# Patient Record
Sex: MALE | Race: WHITE | NOT HISPANIC OR LATINO | Employment: UNEMPLOYED | URBAN - NONMETROPOLITAN AREA
[De-identification: names, ages, dates, MRNs, and addresses within clinical notes are randomized per-mention and may not be internally consistent; named-entity substitution may affect disease eponyms.]

---

## 2018-01-29 ENCOUNTER — TRANSCRIBE ORDERS (OUTPATIENT)
Dept: ADMINISTRATIVE | Facility: HOSPITAL | Age: 43
End: 2018-01-29

## 2018-01-29 ENCOUNTER — HOSPITAL ENCOUNTER (OUTPATIENT)
Dept: GENERAL RADIOLOGY | Facility: HOSPITAL | Age: 43
Discharge: HOME | End: 2018-01-29
Admitting: FAMILY MEDICINE

## 2018-01-29 DIAGNOSIS — M79.672 PAIN IN BOTH FEET: Primary | ICD-10-CM

## 2018-01-29 DIAGNOSIS — M79.671 PAIN IN BOTH FEET: Primary | ICD-10-CM

## 2018-01-29 PROCEDURE — 73630 X-RAY EXAM OF FOOT: CPT

## 2018-02-28 ENCOUNTER — PROCEDURE VISIT (OUTPATIENT)
Dept: ORTHOPEDIC SURGERY | Facility: CLINIC | Age: 43
End: 2018-02-28

## 2018-02-28 DIAGNOSIS — M79.672 LEFT FOOT PAIN: ICD-10-CM

## 2018-02-28 DIAGNOSIS — M54.17 LUMBOSACRAL NEURITIS: ICD-10-CM

## 2018-02-28 DIAGNOSIS — M79.671 RIGHT FOOT PAIN: ICD-10-CM

## 2018-02-28 DIAGNOSIS — R20.2 PARESTHESIA: ICD-10-CM

## 2018-02-28 PROCEDURE — 95911 NRV CNDJ TEST 9-10 STUDIES: CPT | Performed by: PHYSICAL THERAPIST

## 2018-02-28 PROCEDURE — 95886 MUSC TEST DONE W/N TEST COMP: CPT | Performed by: PHYSICAL THERAPIST

## 2018-03-02 ENCOUNTER — CONSULT (OUTPATIENT)
Dept: ONCOLOGY | Facility: CLINIC | Age: 43
End: 2018-03-02

## 2018-03-02 VITALS
WEIGHT: 199 LBS | RESPIRATION RATE: 18 BRPM | SYSTOLIC BLOOD PRESSURE: 127 MMHG | HEART RATE: 80 BPM | HEIGHT: 66 IN | DIASTOLIC BLOOD PRESSURE: 92 MMHG | BODY MASS INDEX: 31.98 KG/M2 | TEMPERATURE: 97.3 F

## 2018-03-02 DIAGNOSIS — M79.671 BILATERAL FOOT PAIN: Primary | ICD-10-CM

## 2018-03-02 DIAGNOSIS — R79.89 ELEVATED FERRITIN LEVEL: ICD-10-CM

## 2018-03-02 DIAGNOSIS — D69.6 THROMBOCYTOPENIA (HCC): Primary | ICD-10-CM

## 2018-03-02 DIAGNOSIS — M79.672 BILATERAL FOOT PAIN: Primary | ICD-10-CM

## 2018-03-02 PROCEDURE — 99204 OFFICE O/P NEW MOD 45 MIN: CPT | Performed by: INTERNAL MEDICINE

## 2018-03-02 RX ORDER — ATORVASTATIN CALCIUM 40 MG/1
TABLET, FILM COATED ORAL
COMMUNITY
Start: 2018-02-16 | End: 2019-04-10 | Stop reason: ALTCHOICE

## 2018-03-02 RX ORDER — HYDROCHLOROTHIAZIDE 12.5 MG/1
TABLET ORAL
COMMUNITY
Start: 2018-02-26 | End: 2019-04-10 | Stop reason: ALTCHOICE

## 2018-03-02 RX ORDER — FOLIC ACID 1 MG/1
TABLET ORAL
COMMUNITY
Start: 2018-02-26 | End: 2019-04-10 | Stop reason: ALTCHOICE

## 2018-03-02 RX ORDER — RANITIDINE 150 MG/1
150 TABLET ORAL 2 TIMES DAILY
COMMUNITY
End: 2019-04-10 | Stop reason: ALTCHOICE

## 2018-03-02 NOTE — PROGRESS NOTES
Subjective     PROBLEM LIST:  1. Thrombocytopenia  2. Erythrocytosis  3. Elevated ferritin  4. Hypertension  5. Hyperlipidemia  6. GERD  7. Restless leg syndrome    CHIEF COMPLAINT: erythrocytosis and thrombocytopenia      HISTORY OF PRESENT ILLNESS:  The patient is a 43 y.o. year old male, referred for evaluation of erythrocytosis, elevated ferritin, and thrombocytopenia.  He says that he had been told previously that his blood counts are abnormal.  He has never been evaluated for this until now.  He currently is in his usual state of health.  No recent fevers, sweats, weight loss.  He does report poor sleep and restless leg symptoms.  His current and previous wife both have mentioned that he snores very loudly and he has had witnessed apnea episodes.  He has never been formally evaluated for sleep apnea.    He currently drinks 3-4 alcoholic drinks every evening.  He does this to help him go to sleep at night.  Previously he drank about a pint of liquor daily and did this for about 4 years.  He also uses tobacco, both cigarettes and chewing tobacco.    REVIEW OF SYSTEMS:  A 14 point review of systems was performed and is negative except as noted above.    Past Medical History:   Diagnosis Date   • Alcohol abuse    • Alcoholism    • Anxiety    • Seizures    • Withdrawal symptoms, alcohol            Current Outpatient Prescriptions on File Prior to Visit   Medication Sig Dispense Refill   • metoprolol tartrate (LOPRESSOR) 50 MG tablet Take 50 mg by mouth 2 (two) times a day.       No current facility-administered medications on file prior to visit.        Allergies   Allergen Reactions   • Promethazine Hallucinations       No past surgical history on file.    Social History     Social History   • Marital status:      Social History Main Topics   • Smoking status: Current Every Day Smoker     Packs/day: 0.50     Years: 30.00     Types: Cigarettes   • Smokeless tobacco: Current User     Types: Snuff   •  "Alcohol use 100.8 oz/week     168 Cans of beer per week      Comment: pt reports drinking 1 pint brown liquor and 24 beers daily   • Drug use: No   • Sexual activity: Yes     Partners: Female     Birth control/ protection: Condom       Family History   Problem Relation Age of Onset   • Alcohol abuse Father    • Anxiety disorder Maternal Uncle    • Depression Maternal Uncle    • Dementia Maternal Grandfather    • Dementia Maternal Grandmother        Objective     /92  Pulse 80  Temp 97.3 °F (36.3 °C) (Temporal Artery )   Resp 18  Ht 167.6 cm (66\")  Wt 90.3 kg (199 lb)  BMI 32.12 kg/m2  Performance Status: 0  General: well appearing male in no acute distress  Neuro: alert and oriented  HEENT: sclera anicteric, oropharynx clear  Lymphatics: no cervical, supraclavicular, or axillary adenopathy  Cardiovascular: regular rate and rhythm, no murmurs  Lungs: clear to auscultation bilaterally  Abdomen: soft, nontender, nondistended.  No palpable organomegaly  Extremeties: no lower extremity edema  Skin: no rashes, lesions, bruising, or petechiae  Psych: mood and affect appropriate      Labs: On 1/30/2018 hemoglobin is 17.5, hematocrit 52.1, platelets 117.  Ferritin is 395.  Platelet count on 8/7/2017 was 120      Assessment/Plan     Raj Hazel is a 43 y.o. year old male referred for evaluation of abnormal blood work.    Erythrocytosis: He has mild erythrocytosis.  We discussed that this is most often a secondary process, triggered by the body trying to compensate for an adequate oxygen.  He has a fairly convincing story for sleep apnea, and may also have impaired oxygen exchange due to tobacco abuse.  I suggested that he talk with his primary care doctor about evaluation for sleep apnea.  At this point his erythrocytosis is not severe and I don't think it warrants any phlebotomy or other treatment.  It appears from his blood work that a Leonid 2 mutation and epo level was tested, but the results were not " available for my review.    Thrombocytopenia: He has mild thrombocytopenia that is stable for at least the past 6 months.  In reviewing his chart he has had thrombocytopenia dating back at least 3 years, with a platelet count down to 40 in the setting of acute alcohol intoxication.  We discussed that alcohol can be a direct bone marrow toxin.  His low platelets may be related to ongoing alcohol use.  I suggested that he stop drinking for 1 month and then we will recheck his blood counts.  It is also possible that he has liver impairment secondary to long-term alcohol use, which can again lead to thrombocytopenia.  I will schedule him to return in one month with repeat labs.    Elevated ferritin: His ferritin elevation is mild.  This again can be seen with liver damage from excessive alcohol use.  I think we will just monitor this for now.           Taylor Alfredo MD    3/2/2018

## 2018-03-06 ENCOUNTER — OFFICE VISIT (OUTPATIENT)
Dept: ORTHOPEDIC SURGERY | Facility: CLINIC | Age: 43
End: 2018-03-06

## 2018-03-06 VITALS — BODY MASS INDEX: 31.82 KG/M2 | HEIGHT: 66 IN | RESPIRATION RATE: 16 BRPM | WEIGHT: 198 LBS

## 2018-03-06 DIAGNOSIS — M79.672 BILATERAL FOOT PAIN: Primary | ICD-10-CM

## 2018-03-06 DIAGNOSIS — M76.61 ACHILLES TENDINITIS OF BOTH LOWER EXTREMITIES: ICD-10-CM

## 2018-03-06 DIAGNOSIS — M76.62 ACHILLES TENDINITIS OF BOTH LOWER EXTREMITIES: ICD-10-CM

## 2018-03-06 DIAGNOSIS — M79.671 BILATERAL FOOT PAIN: Primary | ICD-10-CM

## 2018-03-06 DIAGNOSIS — M72.2 PLANTAR FASCIITIS: ICD-10-CM

## 2018-03-06 PROCEDURE — 99204 OFFICE O/P NEW MOD 45 MIN: CPT | Performed by: PODIATRIST

## 2018-03-06 RX ORDER — ATORVASTATIN CALCIUM 20 MG/1
TABLET, FILM COATED ORAL
COMMUNITY
Start: 2018-01-25 | End: 2019-04-10 | Stop reason: ALTCHOICE

## 2018-03-06 RX ORDER — MELOXICAM 7.5 MG/1
7.5 TABLET ORAL DAILY
Qty: 30 TABLET | Refills: 0 | Status: SHIPPED | OUTPATIENT
Start: 2018-03-06 | End: 2019-04-10 | Stop reason: ALTCHOICE

## 2018-03-06 RX ORDER — DESONIDE 0.5 MG/G
CREAM TOPICAL
COMMUNITY
Start: 2018-02-20 | End: 2019-04-10 | Stop reason: ALTCHOICE

## 2018-03-06 RX ORDER — CLOTRIMAZOLE 1 %
CREAM (GRAM) TOPICAL
COMMUNITY
Start: 2018-02-20 | End: 2019-04-10 | Stop reason: ALTCHOICE

## 2018-03-06 NOTE — PROGRESS NOTES
Subjective   Patient ID: Raj Hazel is a 43 y.o. male   Pain of the Left Foot (Patient denies injury to either foot.) and Pain of the Right Foot  He presents today with a chief complaint of bilateral foot pain but primarily the right.  Says he feels the pain in the back of his heels the most and sometimes in the bottom.  He is a  and up on his feet quite often typically wearing tennis shoes.  He's had no previous treatment at all.  Complains of aching burning and stabbing throbbing pain that is fairly constant.  This is alleviated by rest.  Worse by walking and working and standing.    History of Present Illness       Pain Location: Foot  Pain Orientation: Right, Left     Pain Descriptors: Aching, Burning, Radiating, Stabbing, Throbbing  Pain Frequency: Constant/continuous     Date Pain First Started:  (Patient states symptoms started 3 months ago.)     Aggravating Factors: Standing, Walking           Result of Injury: No  Work-Related Injury: No    Review of Systems   Constitutional: Negative for diaphoresis, fever and unexpected weight change.   HENT: Negative for dental problem and sore throat.    Eyes: Negative for visual disturbance.   Respiratory: Negative for shortness of breath.    Cardiovascular: Negative for chest pain.   Gastrointestinal: Negative for abdominal pain, constipation, diarrhea, nausea and vomiting.   Genitourinary: Negative for difficulty urinating and frequency.   Musculoskeletal: Positive for arthralgias.   Neurological: Negative for headaches.   Hematological: Does not bruise/bleed easily.   All other systems reviewed and are negative.      Past Medical History:   Diagnosis Date   • Alcohol abuse    • Alcoholism    • Anxiety    • Arm fracture, left    • Arm fracture, right    • Arthritis    • Fracture, finger    • Hypertension    • Seizures    • Withdrawal symptoms, alcohol         No past surgical history on file.    Allergies   Allergen Reactions   • Promethazine  Hallucinations       Family History   Problem Relation Age of Onset   • Alcohol abuse Father    • Anxiety disorder Maternal Uncle    • Depression Maternal Uncle    • Dementia Maternal Grandfather    • Dementia Maternal Grandmother        Social History     Social History   • Marital status:      Spouse name: N/A   • Number of children: N/A   • Years of education: N/A     Occupational History   • Not on file.     Social History Main Topics   • Smoking status: Current Every Day Smoker     Packs/day: 0.50     Years: 30.00     Types: Cigarettes   • Smokeless tobacco: Current User     Types: Snuff   • Alcohol use 100.8 oz/week     168 Cans of beer per week      Comment: pt reports drinking 1 pint brown liquor and 24 beers daily   • Drug use: No   • Sexual activity: Yes     Partners: Female     Birth control/ protection: Condom     Other Topics Concern   • Not on file     Social History Narrative       Ready to quit: Not Answered  Counseling given: Not Answered       I have reviewed all of the above social hx, family hx, surgical hx, medications, allergies & ROS and confirm that it is accurate.  Objective   Physical Exam   Constitutional: He is oriented to person, place, and time. He appears well-developed and well-nourished.   HENT:   Head: Normocephalic and atraumatic.   Nose: Nose normal.   Eyes: Conjunctivae and EOM are normal. Pupils are equal, round, and reactive to light.   Neck: Normal range of motion.   Neurological: He is alert and oriented to person, place, and time.   Skin: Skin is warm.   Psychiatric: He has a normal mood and affect. His behavior is normal. Judgment and thought content normal.   Vitals reviewed.    Right Ankle Exam     Range of Motion   The patient has normal right ankle ROM.  Dorsiflexion:  0 (PAINFUL ) abnormal   Plantar flexion:  20 normal     Muscle Strength   The patient has normal right ankle strength.  Dorsiflexion:  5/5  Plantar flexion:  5/5  Gastrocsoleus:  5/5  Other    Sensation: normal  Pulse: present     Comments:  Patient is ttp at the medial tubercle of the plantar calcneus at the plantar fascial insertion, mild edema is present      Left Ankle Exam     Range of Motion   The patient has normal left ankle ROM.   Dorsiflexion:  0 (PAINFUL AT EROM) abnormal   Plantar flexion:  20 normal     Muscle Strength   The patient has normal left ankle strength.  Dorsiflexion:  5/5   Plantar flexion:  5/5   Gastrocsoleus:  5/5    Other   Sensation: normal  Pulse: present    Comments:  Patient is ttp at the plantar medial aspect of the calcaneal tuberosity.          Left Ankle Exam     Range of Motion   Normal left ankle ROM    Muscle Strength   Normal left ankle strength    Comments:  Patient is ttp at the plantar medial aspect of the calcaneal tuberosity.    Right Ankle Exam     Range of Motion   Normal right ankle ROM    Muscle Strength   Normal right ankle strengthComments  Patient is ttp at the medial tubercle of the plantar calcneus at the plantar fascial insertion, mild edema is present            Assessment/Plan bilateral insertional Achilles tendinitis, plantar fasciitis  Independent Review of Radiographic Studies:      Laboratory and Other Studies:     Medical Decision Making:        Procedures  [] No procedures were performed in office today.    Raj was seen today for pain and pain.    Diagnoses and all orders for this visit:    Bilateral foot pain    Achilles tendinitis of both lower extremities    Plantar fasciitis    Other orders  -     diclofenac (VOLTAREN) 1 % gel gel; Apply 4 g topically 4 (Four) Times a Day.  -     meloxicam (MOBIC) 7.5 MG tablet; Take 1 tablet by mouth Daily.        Recommendations/Plan:  I discussed with the patient and educated them on plantar fasciitis its pathology and its treatments.  I will start with power steps and see how they do with these, if these are not helpful we will progress to custom molded orthotics.  I will start them on oral  anti-inflammatories.  I will give them stretching exercises and instructed them on those.  They can perform stretches in the morning before getting out of bed with a towel,theraband, or belt.  I Also showed them weightbearing stretches to do.  They can do ice bottle massage as tolerated.  I'll see them back in 3 weeks and assess for improvement. If no better I will consider corticosteroid injection.  I discussed with the patient the etiology and pathology of Achilles tendinitis and the difficulty in treating it conservatively.  I recommend we start with a heel lift as well as physical therapy and oral and topical anti-inflammatories.  I explained that the Achilles tendon cannot be injected with steroid due to the potential for rupture.  I explained that this can be a chronic ongoing problems that in many instances results and the surgical intervention.  I will have him begin a home exercise program and if this fails send him to formal physical therapy.  and send anti-inflammatory medications to the pharmacy.  I recommended Rice as needed.  Good supportive shoes and/or open back shoes as tolerated.  Follow-up in 3-4 weeks.      Return in about 3 weeks (around 3/27/2018).  Patient agreeable to call or return sooner for any concerns.

## 2019-03-31 ENCOUNTER — HOSPITAL ENCOUNTER (EMERGENCY)
Facility: HOSPITAL | Age: 44
Discharge: HOME | End: 2019-03-31
Attending: EMERGENCY MEDICINE | Admitting: EMERGENCY MEDICINE

## 2019-03-31 VITALS
RESPIRATION RATE: 18 BRPM | WEIGHT: 196 LBS | DIASTOLIC BLOOD PRESSURE: 106 MMHG | TEMPERATURE: 98.3 F | HEIGHT: 66 IN | BODY MASS INDEX: 31.5 KG/M2 | OXYGEN SATURATION: 98 % | SYSTOLIC BLOOD PRESSURE: 144 MMHG | HEART RATE: 124 BPM

## 2019-03-31 DIAGNOSIS — E87.3: Primary | ICD-10-CM

## 2019-03-31 LAB
ALBUMIN SERPL-MCNC: 4.4 G/DL (ref 3.5–5)
ALBUMIN/GLOB SERPL: 1.3 G/DL (ref 1–2)
ALP SERPL-CCNC: 103 U/L (ref 38–126)
ALT SERPL W P-5'-P-CCNC: 37 U/L (ref 13–69)
ANION GAP SERPL CALCULATED.3IONS-SCNC: 20.6 MMOL/L (ref 10–20)
AST SERPL-CCNC: 81 U/L (ref 15–46)
BASOPHILS # BLD AUTO: 0.08 10*3/MM3 (ref 0–0.2)
BASOPHILS NFR BLD AUTO: 0.9 % (ref 0–1.5)
BILIRUB SERPL-MCNC: 1 MG/DL (ref 0.2–1.3)
BUN BLD-MCNC: 17 MG/DL (ref 7–20)
BUN/CREAT SERPL: 13.1 (ref 6.3–21.9)
CALCIUM SPEC-SCNC: 9 MG/DL (ref 8.4–10.2)
CHLORIDE SERPL-SCNC: 92 MMOL/L (ref 98–107)
CO2 SERPL-SCNC: 23 MMOL/L (ref 26–30)
CREAT BLD-MCNC: 1.3 MG/DL (ref 0.6–1.3)
DEPRECATED RDW RBC AUTO: 41.7 FL (ref 37–54)
EOSINOPHIL # BLD AUTO: 0.09 10*3/MM3 (ref 0–0.4)
EOSINOPHIL NFR BLD AUTO: 1 % (ref 0.3–6.2)
ERYTHROCYTE [DISTWIDTH] IN BLOOD BY AUTOMATED COUNT: 12.7 % (ref 12.3–15.4)
GFR SERPL CREATININE-BSD FRML MDRD: 59 ML/MIN/1.73
GLOBULIN UR ELPH-MCNC: 3.5 GM/DL
GLUCOSE BLD-MCNC: 98 MG/DL (ref 74–98)
HCT VFR BLD AUTO: 47.5 % (ref 37.5–51)
HGB BLD-MCNC: 17.6 G/DL (ref 13–17.7)
IMM GRANULOCYTES # BLD AUTO: 0.02 10*3/MM3 (ref 0–0.05)
IMM GRANULOCYTES NFR BLD AUTO: 0.2 % (ref 0–0.5)
LIPASE SERPL-CCNC: 48 U/L (ref 23–300)
LYMPHOCYTES # BLD AUTO: 1.74 10*3/MM3 (ref 0.7–3.1)
LYMPHOCYTES NFR BLD AUTO: 18.6 % (ref 19.6–45.3)
MCH RBC QN AUTO: 33 PG (ref 26.6–33)
MCHC RBC AUTO-ENTMCNC: 37.1 G/DL (ref 31.5–35.7)
MCV RBC AUTO: 89.1 FL (ref 79–97)
MONOCYTES # BLD AUTO: 0.82 10*3/MM3 (ref 0.1–0.9)
MONOCYTES NFR BLD AUTO: 8.8 % (ref 5–12)
NEUTROPHILS # BLD AUTO: 6.59 10*3/MM3 (ref 1.4–7)
NEUTROPHILS NFR BLD AUTO: 70.5 % (ref 42.7–76)
NRBC BLD AUTO-RTO: 0 /100 WBC (ref 0–0)
PLATELET # BLD AUTO: 177 10*3/MM3 (ref 140–450)
PMV BLD AUTO: 10.1 FL (ref 6–12)
POTASSIUM BLD-SCNC: 3.6 MMOL/L (ref 3.5–5.1)
PROT SERPL-MCNC: 7.9 G/DL (ref 6.3–8.2)
RBC # BLD AUTO: 5.33 10*6/MM3 (ref 4.14–5.8)
S PYO AG THROAT QL: NEGATIVE
SODIUM BLD-SCNC: 132 MMOL/L (ref 137–145)
WBC NRBC COR # BLD: 9.34 10*3/MM3 (ref 3.4–10.8)

## 2019-03-31 PROCEDURE — 87081 CULTURE SCREEN ONLY: CPT | Performed by: NURSE PRACTITIONER

## 2019-03-31 PROCEDURE — 87880 STREP A ASSAY W/OPTIC: CPT | Performed by: NURSE PRACTITIONER

## 2019-03-31 PROCEDURE — 93005 ELECTROCARDIOGRAM TRACING: CPT | Performed by: EMERGENCY MEDICINE

## 2019-03-31 PROCEDURE — 80053 COMPREHEN METABOLIC PANEL: CPT | Performed by: EMERGENCY MEDICINE

## 2019-03-31 PROCEDURE — 85025 COMPLETE CBC W/AUTO DIFF WBC: CPT | Performed by: EMERGENCY MEDICINE

## 2019-03-31 PROCEDURE — 99283 EMERGENCY DEPT VISIT LOW MDM: CPT

## 2019-03-31 PROCEDURE — 83690 ASSAY OF LIPASE: CPT | Performed by: EMERGENCY MEDICINE

## 2019-04-02 LAB — BACTERIA SPEC AEROBE CULT: NORMAL

## 2019-04-10 ENCOUNTER — OFFICE VISIT (OUTPATIENT)
Dept: GASTROENTEROLOGY | Facility: CLINIC | Age: 44
End: 2019-04-10

## 2019-04-10 VITALS
RESPIRATION RATE: 16 BRPM | TEMPERATURE: 98.6 F | WEIGHT: 208 LBS | HEIGHT: 67 IN | HEART RATE: 74 BPM | DIASTOLIC BLOOD PRESSURE: 100 MMHG | SYSTOLIC BLOOD PRESSURE: 166 MMHG | BODY MASS INDEX: 32.65 KG/M2

## 2019-04-10 DIAGNOSIS — D69.6 THROMBOCYTOPENIA (HCC): Chronic | ICD-10-CM

## 2019-04-10 DIAGNOSIS — R12 HEARTBURN: Primary | Chronic | ICD-10-CM

## 2019-04-10 DIAGNOSIS — R79.89 ELEVATED LIVER FUNCTION TESTS: Chronic | ICD-10-CM

## 2019-04-10 DIAGNOSIS — F10.10 ALCOHOL ABUSE: Chronic | ICD-10-CM

## 2019-04-10 PROCEDURE — 99204 OFFICE O/P NEW MOD 45 MIN: CPT | Performed by: NURSE PRACTITIONER

## 2019-04-10 RX ORDER — ASPIRIN 81 MG/1
81 TABLET ORAL DAILY
COMMUNITY
End: 2019-04-10 | Stop reason: ALTCHOICE

## 2019-04-10 RX ORDER — SODIUM CHLORIDE 9 MG/ML
70 INJECTION, SOLUTION INTRAVENOUS CONTINUOUS PRN
Status: CANCELLED | OUTPATIENT
Start: 2019-04-18

## 2019-04-10 RX ORDER — OMEPRAZOLE 20 MG/1
20 CAPSULE, DELAYED RELEASE ORAL DAILY
COMMUNITY
End: 2019-04-18 | Stop reason: HOSPADM

## 2019-04-10 RX ORDER — LORATADINE 10 MG/1
CAPSULE, LIQUID FILLED ORAL
COMMUNITY
End: 2019-04-10 | Stop reason: ALTCHOICE

## 2019-04-10 NOTE — PROGRESS NOTES
Chief Complaint   Patient presents with   • Heartburn     There is a long standing history of heartburn. The patient has had worsening of heartburn. He had taken Omeprazole in the past with reasonable control, but he was taken off of his medication. He has started taking Omeprazole OTC due to the heartburn worsening. Heartburn is severe. It is worse at night. The patient drinks at least 10 cans of soda daily and about 7 cans of beer per day on the weekends. He frequently eats hot spicy foods and tomato based foods.    The patient used to drank a pint of whiskey daily in the past and occasionally beer along with whiskey. He used to drink up to a fifth of whiskey daily on the weekends. He has cut back to 7 beers per day on weekends. He has a history of thrombocytopenia, but his labs improved after decreasing alcohol intake. His liver enzymes have been elevated off and on for a few years. He has not had liver evaluation in the past. There is no history of liver disease in the past.    The patient denies recent change in bowel habits. There is no diarrhea or constipation. There is no history of abdominal pain. There is no history of overt GI bleed (hematemesis melena or hematochezia). The patient denies nausea or vomiting. The patient denies dysphagia or odynophagia. There is no history of recent significant weight loss.  There is a family history of colon cancer in an uncle. The patient has not had a colonoscopy in the past.    Heartburn   He complains of heartburn. He reports no abdominal pain, no chest pain, no coughing or no nausea. This is a chronic problem. Episode onset: over 5 years. The problem has been gradually worsening. The heartburn duration is an hour. The heartburn is located in the substernum. The heartburn is of severe intensity. The heartburn wakes him from sleep. The symptoms are aggravated by certain foods. Pertinent negatives include no fatigue. Risk factors include ETOH use and caffeine use. He  has tried a PPI for the symptoms.     Review of Systems   Constitutional: Negative for appetite change, chills, fatigue, fever and unexpected weight change.   HENT: Negative for mouth sores, nosebleeds and trouble swallowing.    Eyes: Negative for discharge and redness.   Respiratory: Positive for apnea. Negative for cough and shortness of breath.    Cardiovascular: Negative for chest pain, palpitations and leg swelling.   Gastrointestinal: Positive for heartburn. Negative for abdominal distention, abdominal pain, anal bleeding, blood in stool, constipation, diarrhea, nausea and vomiting.   Endocrine: Negative for cold intolerance, heat intolerance and polydipsia.   Genitourinary: Negative for dysuria, hematuria and urgency.   Musculoskeletal: Positive for arthralgias and back pain. Negative for joint swelling and myalgias.   Skin: Negative for rash.   Allergic/Immunologic: Negative for food allergies and immunocompromised state.   Neurological: Negative for dizziness, seizures, syncope and headaches.   Hematological: Negative for adenopathy. Does not bruise/bleed easily.   Psychiatric/Behavioral: Negative for dysphoric mood. The patient is nervous/anxious. The patient is not hyperactive.      Patient Active Problem List   Diagnosis   • Alcohol dependence with delirium (CMS/HCC)   • Cigarette nicotine dependence with withdrawal   • Recurrent brief depressive disorder (CMS/HCC)   • Elevated transaminase level   • Thrombocytopenia (CMS/HCC)   • Heartburn   • Elevated liver function tests   • Alcohol abuse     Past Medical History:   Diagnosis Date   • Alcoholism (CMS/HCC)    • Anxiety    • Arm fracture, left    • Arm fracture, right    • Arthritis    • Back pain    • Fracture, finger    • GERD (gastroesophageal reflux disease)    • Hypertension    • Panic    • Seizures (CMS/HCC)    • Sleep apnea    • Withdrawal symptoms, alcohol (CMS/HCC)      History reviewed. No pertinent surgical history.  Family History   Problem  "Relation Age of Onset   • Alcohol abuse Father    • Bone cancer Father 51   • Anxiety disorder Maternal Uncle    • Depression Maternal Uncle    • Dementia Maternal Grandfather    • Dementia Maternal Grandmother    • Brain cancer Mother 32   • Colon cancer Paternal Uncle 60   • Pancreatic cancer Paternal Grandfather 80     Social History     Tobacco Use   • Smoking status: Former Smoker     Packs/day: 0.50     Years: 30.00     Pack years: 15.00     Types: Cigarettes     Last attempt to quit: 4/10/2019   • Smokeless tobacco: Current User     Types: Snuff, Chew   Substance Use Topics   • Alcohol use: Yes     Alcohol/week: 100.8 oz     Types: 168 Cans of beer per week     Comment: pt reports drinking up to 1 pint brown liquor and 24 beers daily       Current Outpatient Medications:   •  LOSARTAN POTASSIUM PO, Take 50 mg by mouth Daily., Disp: , Rfl:   •  omeprazole (priLOSEC) 20 MG capsule, Take 20 mg by mouth Daily., Disp: , Rfl:     Allergies   Allergen Reactions   • Promethazine Hallucinations     /100   Pulse 74   Temp 98.6 °F (37 °C)   Resp 16   Ht 170.2 cm (67\")   Wt 94.3 kg (208 lb)   BMI 32.58 kg/m²     Physical Exam   Constitutional: He is oriented to person, place, and time. He appears well-developed and well-nourished. No distress.   HENT:   Head: Normocephalic and atraumatic.   Right Ear: Hearing and external ear normal.   Left Ear: Hearing and external ear normal.   Nose: Nose normal.   Mouth/Throat: Oropharynx is clear and moist and mucous membranes are normal. Mucous membranes are not pale, not dry and not cyanotic. No oral lesions. No oropharyngeal exudate.   Eyes: Conjunctivae and EOM are normal. Right eye exhibits no discharge. Left eye exhibits no discharge.   Neck: Trachea normal. Neck supple. No JVD present. No edema present. No thyroid mass and no thyromegaly present.   Cardiovascular: Normal rate, regular rhythm, S2 normal and normal heart sounds. Exam reveals no gallop, no S3 and no " friction rub.   No murmur heard.  Pulmonary/Chest: Effort normal and breath sounds normal. No respiratory distress. He exhibits no tenderness.   Abdominal: Normal appearance and bowel sounds are normal. He exhibits no distension, no ascites and no mass. There is no splenomegaly or hepatomegaly. There is no tenderness. There is no rigidity, no rebound and no guarding. No hernia.     Vascular Status -  His right foot exhibits no edema. His left foot exhibits no edema.  Lymphadenopathy:     He has no cervical adenopathy.        Left: No supraclavicular adenopathy present.   Neurological: He is alert and oriented to person, place, and time. He has normal strength. No cranial nerve deficit or sensory deficit.   Skin: No rash noted. He is not diaphoretic. No cyanosis. No pallor. Nails show no clubbing.   Psychiatric: He has a normal mood and affect.   Nursing note and vitals reviewed.  Stigmata of chronic liver disease:  None.  Asterixis:  None.     Laboratory Tests:   Upon review of records:     Dated 1/30/2018 glucose 78 potassium 4.0 sodium 136 BUN 14 creatinine 0.97 calcium 9.5 CO2 26 chloride 98 albumin 4.6 alkaline phosphatase 75 ALT 15 AST 17 total bilirubin 0.8 WBC 8.9 hemoglobin 17.5 hematocrit 52.1 platelet count 117 MCV 93.2 Hemoglobin A1c 4.9, TSH 2.21, folate >24.0, B12 543, ferritin 395, erythropoietin 5.6, hepatitis A IgM: Nonreactive, hepatitis B core IgM: Nonreactive, hepatitis B surface antigen: Nonreactive, hepatitis C antibody: Nonreactive, HIV AG/AB, fourth generation: Nonreactive, LEXI: Negative, rheumatoid factor: <14, CALEB EXON 12/13 mutations: Not detected, JAK2 [id number]: not detected    Dated 3/31/2019 glucose 98 BUN 17 creatinine 1.3 sodium 132 potassium 3.6 chloride 92 CO2 23 calcium 9.0 albumin 4.4 ALT 37 AST 81 alkaline phosphatase 103 total bilirubin 1.0 WBC 9.34 hemoglobin 17.6 hematocrit 47.5 platelet count 177 MCV 89.1 lipase 48    Assessment:      ICD-10-CM ICD-9-CM   1. Heartburn R12 787.1    2. Elevated liver function tests R94.5 790.6   3. Thrombocytopenia (CMS/HCC) D69.6 287.5   4. Alcohol abuse F10.10 305.00     Plan/  Patient Instructions   1. Antireflux measures: Avoid fried, fatty foods, alcohol, chocolate, coffee, tea,  soft drinks, peppermint and spearmint, spicy foods, tomatoes and tomato based foods, onion based foods, and smoking. Other antireflux measures include weight reduction if overweight, avoiding tight clothing around the abdomen, elevating the head of the bed 6 inches with blocks under the head board, and don't drink or eat before going to bed and avoid lying down immediately after meals.  2. Omeprazole 20 mg 1 tablet by mouth in the am 30 minutes before breakfast.  3. Abdominal ultrasound.   4. Possible noninvasive liver work up in the future.  5. Continue to decrease/avoid alcohol.  6. Upper endoscopy-EGD: Description of the procedure, risks, benefits, alternatives and options, including nonoperative options, were discussed with the patient in detail. The patient understands and wishes to proceed.     AR Pederson

## 2019-04-10 NOTE — PATIENT INSTRUCTIONS
1. Antireflux measures: Avoid fried, fatty foods, alcohol, chocolate, coffee, tea,  soft drinks, peppermint and spearmint, spicy foods, tomatoes and tomato based foods, onion based foods, and smoking. Other antireflux measures include weight reduction if overweight, avoiding tight clothing around the abdomen, elevating the head of the bed 6 inches with blocks under the head board, and don't drink or eat before going to bed and avoid lying down immediately after meals.  2. Omeprazole 20 mg 1 tablet by mouth in the am 30 minutes before breakfast.  3. Abdominal ultrasound.   4. Possible noninvasive liver work up in the future.  5. Continue to decrease/avoid alcohol.  6. Upper endoscopy-EGD: Description of the procedure, risks, benefits, alternatives and options, including nonoperative options, were discussed with the patient in detail. The patient understands and wishes to proceed.

## 2019-04-18 ENCOUNTER — ANESTHESIA (OUTPATIENT)
Dept: GASTROENTEROLOGY | Facility: HOSPITAL | Age: 44
End: 2019-04-18

## 2019-04-18 ENCOUNTER — ANESTHESIA EVENT (OUTPATIENT)
Dept: GASTROENTEROLOGY | Facility: HOSPITAL | Age: 44
End: 2019-04-18

## 2019-04-18 ENCOUNTER — HOSPITAL ENCOUNTER (OUTPATIENT)
Facility: HOSPITAL | Age: 44
Setting detail: HOSPITAL OUTPATIENT SURGERY
Discharge: HOME | End: 2019-04-18
Attending: INTERNAL MEDICINE | Admitting: INTERNAL MEDICINE

## 2019-04-18 VITALS
RESPIRATION RATE: 16 BRPM | SYSTOLIC BLOOD PRESSURE: 140 MMHG | DIASTOLIC BLOOD PRESSURE: 95 MMHG | HEART RATE: 62 BPM | WEIGHT: 200 LBS | HEIGHT: 67 IN | OXYGEN SATURATION: 99 % | BODY MASS INDEX: 31.39 KG/M2 | TEMPERATURE: 97.4 F

## 2019-04-18 DIAGNOSIS — F10.10 ALCOHOL ABUSE: ICD-10-CM

## 2019-04-18 DIAGNOSIS — R12 HEARTBURN: ICD-10-CM

## 2019-04-18 DIAGNOSIS — R79.89 ELEVATED LIVER FUNCTION TESTS: ICD-10-CM

## 2019-04-18 DIAGNOSIS — D69.6 THROMBOCYTOPENIA (HCC): ICD-10-CM

## 2019-04-18 PROCEDURE — 25010000002 ONDANSETRON PER 1 MG: Performed by: NURSE ANESTHETIST, CERTIFIED REGISTERED

## 2019-04-18 PROCEDURE — 25010000002 MIDAZOLAM PER 1 MG: Performed by: NURSE ANESTHETIST, CERTIFIED REGISTERED

## 2019-04-18 PROCEDURE — 25010000002 PROPOFOL 10 MG/ML EMULSION: Performed by: NURSE ANESTHETIST, CERTIFIED REGISTERED

## 2019-04-18 PROCEDURE — 43239 EGD BIOPSY SINGLE/MULTIPLE: CPT | Performed by: INTERNAL MEDICINE

## 2019-04-18 PROCEDURE — 25010000002 FENTANYL CITRATE (PF) 100 MCG/2ML SOLUTION: Performed by: NURSE ANESTHETIST, CERTIFIED REGISTERED

## 2019-04-18 RX ORDER — ONDANSETRON 2 MG/ML
INJECTION INTRAMUSCULAR; INTRAVENOUS AS NEEDED
Status: DISCONTINUED | OUTPATIENT
Start: 2019-04-18 | End: 2019-04-18 | Stop reason: SURG

## 2019-04-18 RX ORDER — PROPOFOL 10 MG/ML
VIAL (ML) INTRAVENOUS AS NEEDED
Status: DISCONTINUED | OUTPATIENT
Start: 2019-04-18 | End: 2019-04-18 | Stop reason: SURG

## 2019-04-18 RX ORDER — OMEPRAZOLE 40 MG/1
CAPSULE, DELAYED RELEASE ORAL
Qty: 30 CAPSULE | Refills: 3 | Status: SHIPPED | OUTPATIENT
Start: 2019-04-18 | End: 2020-02-10

## 2019-04-18 RX ORDER — KETAMINE HYDROCHLORIDE 50 MG/ML
INJECTION, SOLUTION, CONCENTRATE INTRAMUSCULAR; INTRAVENOUS AS NEEDED
Status: DISCONTINUED | OUTPATIENT
Start: 2019-04-18 | End: 2019-04-18 | Stop reason: SURG

## 2019-04-18 RX ORDER — MIDAZOLAM HYDROCHLORIDE 1 MG/ML
INJECTION INTRAMUSCULAR; INTRAVENOUS AS NEEDED
Status: DISCONTINUED | OUTPATIENT
Start: 2019-04-18 | End: 2019-04-18 | Stop reason: SURG

## 2019-04-18 RX ORDER — FENTANYL CITRATE 50 UG/ML
INJECTION, SOLUTION INTRAMUSCULAR; INTRAVENOUS AS NEEDED
Status: DISCONTINUED | OUTPATIENT
Start: 2019-04-18 | End: 2019-04-18 | Stop reason: SURG

## 2019-04-18 RX ORDER — SODIUM CHLORIDE 9 MG/ML
70 INJECTION, SOLUTION INTRAVENOUS CONTINUOUS PRN
Status: DISCONTINUED | OUTPATIENT
Start: 2019-04-18 | End: 2019-04-18 | Stop reason: HOSPADM

## 2019-04-18 RX ORDER — SIMETHICONE 20 MG/.3ML
EMULSION ORAL AS NEEDED
Status: DISCONTINUED | OUTPATIENT
Start: 2019-04-18 | End: 2019-04-18 | Stop reason: HOSPADM

## 2019-04-18 RX ADMIN — ONDANSETRON 4 MG: 2 INJECTION INTRAMUSCULAR; INTRAVENOUS at 08:11

## 2019-04-18 RX ADMIN — PROPOFOL 80 MG: 10 INJECTION, EMULSION INTRAVENOUS at 08:28

## 2019-04-18 RX ADMIN — PROPOFOL 80 MG: 10 INJECTION, EMULSION INTRAVENOUS at 08:21

## 2019-04-18 RX ADMIN — SODIUM CHLORIDE 70 ML/HR: 9 INJECTION, SOLUTION INTRAVENOUS at 06:57

## 2019-04-18 RX ADMIN — MIDAZOLAM HYDROCHLORIDE 1 MG: 1 INJECTION, SOLUTION INTRAMUSCULAR; INTRAVENOUS at 08:11

## 2019-04-18 RX ADMIN — FENTANYL CITRATE 50 MCG: 50 INJECTION, SOLUTION INTRAMUSCULAR; INTRAVENOUS at 08:11

## 2019-04-18 RX ADMIN — LIDOCAINE HYDROCHLORIDE 80 MG: 20 INJECTION, SOLUTION INTRAVENOUS at 08:21

## 2019-04-18 RX ADMIN — KETAMINE HYDROCHLORIDE 10 MG: 50 INJECTION, SOLUTION INTRAMUSCULAR; INTRAVENOUS at 08:21

## 2019-04-18 NOTE — OP NOTE
PROCEDURE:  Upper Endoscopy with biopsies.    DATE OF PROCEDURE: April 18, 2019.    REFERRING PROVIDER:  Shagufta Joy MD.     INSTRUMENT:   Olympus GIF H180 J video endoscope.       INDICATIONS OF THE PROCEDURE:    This is a 44-year-old white male with history of recurrent reflux.  Currently undergoing upper endoscopy for further evaluation.  The patient also has history of thrombocytopenia and significant alcohol use.. Concerns regarding possible esophageal varices have been dressed.     BIOPSIES: Second portion of duodenum.  Gastric antrum, body and fundus.  Polypoid area at cardia.  Short segment Winkler's type mucosa in the distal esophagus.  MEDICATIONS:  MAC.     PHOTOGRAPHS:  Photographs were included in the medical records.     CONSENT/PREPROCEDURE EVALUATION:  Risks, benefits, alternatives and options of the procedure including risks of anesthesia/sedation were discussed and informed consent was obtained prior to the procedure. History and physical examination were performed and nothing precluded the test.     REPORT:  The patient was placed in left lateral decubitus position. Once under the influence of IV sedation, the instrument was inserted into the mouth and esophagus was intubated under direct vision without difficulty.     Esophagus:  Z line was noted to be around 42  cm.  Erosive distal esophagitis. LA class A.  1 cm tongue of gastric type mucosa was seen in the distal esophagus.  Biopsies were obtained.  Polypoid area at cardia.  This was biopsied.  A small sliding hiatal hernia less than 3 cm was noted.  No esophageal varices.     Stomach:  Antrum:  Erythematous gastritis.  Angulus, lesser and greater curves: Normal.  Retroflex examination: Sliding hiatal hernia.  Cardia and fundus:  Normal.  No cardia or gastric varices.  No portal hypertensive gastropathy.     Body of the stomach: Erythematous gastritis.  Good distensibility of the stomach was achieved no giant folds were noted.   Biopsies  were obtained from the gastric antrum,  body and fundus.    Pylorus and pyloric channel:  normal.     Duodenum:  Bulb: Normal.  Second portion: normal.  Subtle scalloping was seen in the second portion of duodenum.  Biopsies were obtained from the second portion of duodenum.    Intervention:  None.       The upper GI tract was decompressed and the scope was pulled out of the patient. The patient tolerated the procedure well.     DIAGNOSES:     1. Erosive distal esophagitis. LA class A.  2. Short segment Winkler's appearing mucosa.  3. Small sliding hiatal hernia less than 3 cm.  4. Polypoid area at cardia.  5. Subtle scalloping within the second portion of duodenum.  6. No esophageal, cardia or gastric varices.  No endoscopic suggestion of portal hypertensive gastropathy.      RECOMMENDATIONS:  1.  Dietary instructions.  2.  Prilosec(Omeprazole) DR capsule 40 mg. Take one capsule in the morning half an hour before eating daily.  3.  Follow biopsies.  4.  Follow up in office.  5.  Avoid alcohol.     Thank you very much for letting me participate in the care of this patient. Please do not hesitate to call me if you have any questions.

## 2019-04-18 NOTE — INTERVAL H&P NOTE
"  H&P reviewed. The patient was examined and there are no changes to the H&P.       No recent shortness of breath or chest pains.      Blood pressure 140/100, pulse 78, temperature 98.4 °F (36.9 °C), temperature source Temporal, resp. rate 18, height 170.2 cm (67\"), weight 90.7 kg (200 lb), SpO2 98 %.    Chest: clear to auscultation.  Cardiac exam: No S3 no murmurs.   Abdomen: soft bowel sounds present nondistended nontender.        "

## 2019-04-18 NOTE — DISCHARGE INSTRUCTIONS
No pushing, pulling, tugging,  heavy lifting, or strenuous activity.  No major decision making, driving, or drinking alcoholic beverages for 24 hours. ( due to the medications you have  received)  Always use good hand hygiene/washing techniques.  NO driving while taking pain medications.    To assist you in voiding:  Drink plenty of fluids  Listen to running water while attempting to void.    If you are unable to urinate and you have an uncomfortable urge to void or it has been   6 hours since you were discharged, return to the Emergency Room        ************************************************************************************      Postprocedure instructions.  1. Nothing by mouth for 30 min.  2. Clear liquids diet (No Sodas) for 1 hours.  3. May advance to soft low-fat diet  in 2 hours       Diet otherwise:    1. Low fat diet.    2. Avoid soda beverages, excessive use of coffee and tea.   3. Avoid smoking and alcohol.      Other Instructions:  Call [facility] at [phone] or come to the Emergency Department if you experience the following: Chest pain, abdominal pain, bleeding (vomiting of blood or coffee colored material, black stools or preet blood in stools),   fever/chills, nausea and vomiting or dizziness.    Follow-up:    Dr. Merrill in 3-4 weeks.   Office phone #[phone].   If you already have an appointment just keep that appointment.    ************************************************************************************    Notes to the patient and the family from Dr. Merrill.    Dear patient/family member,    Findings on today's procedure are as follows:  1. Esophagitis. Inflammation of the esophagus.  2. Inflammation of the stomach. Gastritis.    3. Small sliding hiatal hernia.    4. No cancer. No active ulcers.  5. No evidence of varicose veins food pipe or stomach.    Recommendations:    1. Prilosec(Omeprazole) DR capsule 40 mg. Take one capsule in the morning half an hour before  eating daily.  2. Other instructions as above.      Should you have more questions please do not hesitate to talk to the nurse who can call me and let me talk to you.      I hope you feel better.    Bari Merrill M.D., FACP, FACG.

## 2019-04-18 NOTE — ANESTHESIA POSTPROCEDURE EVALUATION
Patient: Raj Hazel    Procedure Summary     Date:  04/18/19 Room / Location:  [facility] ENDOSCOPY 2 / [facility] ENDOSCOPY    Anesthesia Start:  0811 Anesthesia Stop:      Procedure:  ESOPHAGOGASTRODUODENOSCOPY with biopsies (N/A Esophagus) Diagnosis:       Heartburn      Elevated liver function tests      Thrombocytopenia (CMS/HCC)      Alcohol abuse      (Heartburn [R12])      (Elevated liver function tests [R94.5])      (Thrombocytopenia (CMS/HCC) [D69.6])      (Alcohol abuse [F10.10])    Surgeon:  Bari Merrill MD Provider:  Ezequiel Bradley CRNA    Anesthesia Type:  MAC ASA Status:  2          Anesthesia Type: MAC  Last vitals  BP   145/80   Temp     98   Pulse   79   Resp   12   SpO2   96     Post Anesthesia Care and Evaluation    Patient location during evaluation: bedside  Patient participation: complete - patient participated  Level of consciousness: awake and alert  Pain score: 0  Pain management: adequate  Airway patency: patent  Anesthetic complications: No anesthetic complications  PONV Status: none  Cardiovascular status: acceptable  Respiratory status: acceptable and nasal cannula  Hydration status: acceptable

## 2019-04-18 NOTE — ANESTHESIA PREPROCEDURE EVALUATION
Anesthesia Evaluation     Patient summary reviewed and Nursing notes reviewed   no history of anesthetic complications:  NPO Solid Status: > 8 hours  NPO Liquid Status: > 8 hours           Airway   Mallampati: I  TM distance: >3 FB  Neck ROM: full  no difficulty expected  Dental - normal exam     Pulmonary - normal exam   (+) a smoker Former, sleep apnea,   Cardiovascular - normal exam    Rhythm: regular  Rate: normal    (+) hypertension,       Neuro/Psych  (+) seizures, TIA, psychiatric history Anxiety,     GI/Hepatic/Renal/Endo    (+) obesity,  GERD,  liver disease history of elevated LFT,     Musculoskeletal     (+) back pain,   Abdominal  - normal exam    Abdomen: soft.  Bowel sounds: normal.   Substance History   (+) alcohol use,      OB/GYN          Other   (+) arthritis     ROS/Med Hx Other: RLS                  Anesthesia Plan    ASA 2     MAC   (Risks and benefits discussed including risk of aspiration, recall and dental damage. All patient questions answered. Will continue with POC.)  intravenous induction   Anesthetic plan, all risks, benefits, and alternatives have been provided, discussed and informed consent has been obtained with: patient.

## 2019-04-30 LAB
LAB AP CASE REPORT: NORMAL
PATH REPORT.ADDENDUM SPEC: NORMAL
PATH REPORT.FINAL DX SPEC: NORMAL

## 2019-05-10 NOTE — ED PROVIDER NOTES
"Subjective   Patient presents to the emergency department with complaint of a drawing sensation to his hands and feet which was occurring yesterday intermittently and earlier today, now improved.  Patient states \"sometimes I get like this when my potassium is low\".  Further inquiry reveals that the patient has been anxious and his symptoms are associated with breathing rapidly.  He denies any chest pain, shortness of breath or syncope.  No neurosensory complaints or focal weakness.        History provided by:  Patient   used: No    Anxiety   Presents for initial visit. Onset was in the past 7 days. The problem has been gradually improving. Symptoms include decreased concentration, hyperventilation, nervous/anxious behavior and shortness of breath. Patient reports no chest pain, confusion or suicidal ideas. Symptoms occur occasionally. The most recent episode lasted 2 days. The severity of symptoms is mild. The symptoms are aggravated by specific phobias.     Risk factors include alcohol intake. His past medical history is significant for anxiety/panic attacks. Past treatments include nothing. Compliance with prior treatments has been good. Prior compliance problems include medical issues.       Review of Systems   Constitutional: Negative for appetite change, chills, diaphoresis and fever.   HENT: Negative.    Eyes: Negative.  Negative for photophobia and visual disturbance.   Respiratory: Positive for shortness of breath.    Cardiovascular: Negative for chest pain and leg swelling.   Gastrointestinal: Negative.  Negative for vomiting.   Genitourinary: Negative.    Musculoskeletal: Negative.    Skin: Negative for pallor and rash.   Neurological: Negative for tremors, seizures, facial asymmetry, speech difficulty and weakness.        Tingling and numbness to the distal hands and feet; now resolved.  Worse in hands and \"drawing up\".  Also occasionally experiences tingling around mouth circumorally. "      Hematological: Negative.    Psychiatric/Behavioral: Positive for decreased concentration. Negative for confusion, self-injury and suicidal ideas. The patient is nervous/anxious.    All other systems reviewed and are negative.      Past Medical History:   Diagnosis Date   • Acne    • Alcoholism (CMS/HCC)    • Anxiety    • Arm fracture, left    • Arm fracture, right    • Arthritis    • Back pain    • Fracture, finger    • GERD (gastroesophageal reflux disease)    • Hypertension    • Panic    • RLS (restless legs syndrome)    • Seizures (CMS/HCC)    • Sleep apnea    • TIA (transient ischemic attack)    • Withdrawal symptoms, alcohol (CMS/HCC)        Allergies   Allergen Reactions   • Promethazine Hallucinations       Past Surgical History:   Procedure Laterality Date   • ENDOSCOPY N/A 2019    Procedure: ESOPHAGOGASTRODUODENOSCOPY with biopsies;  Surgeon: Bari Merrill MD;  Location: [facility] ENDOSCOPY;  Service: Gastroenterology       Family History   Problem Relation Age of Onset   • Alcohol abuse Father    • Bone cancer Father 51   • Anxiety disorder Maternal Uncle    • Depression Maternal Uncle    • Dementia Maternal Grandfather    • Dementia Maternal Grandmother    • Brain cancer Mother 32   • Colon cancer Paternal Uncle 60   • Pancreatic cancer Paternal Grandfather 80       Social History     Socioeconomic History   • Marital status:      Spouse name: Not on file   • Number of children: Not on file   • Years of education: Not on file   • Highest education level: Not on file   Tobacco Use   • Smoking status: Former Smoker     Packs/day: 0.50     Years: 30.00     Pack years: 15.00     Types: Cigarettes     Last attempt to quit: 2018     Years since quittin.5   • Smokeless tobacco: Current User     Types: Snuff, Chew   Substance and Sexual Activity   • Alcohol use: Yes     Alcohol/week: 29.4 oz     Types: 49 Cans of beer per week     Comment: 1 pint whiskey daily for 3 years; occasioanl a  fifth on weekends when drinking beers   • Drug use: No   • Sexual activity: Yes     Partners: Female     Birth control/protection: Condom           Objective   Physical Exam   Constitutional: He is oriented to person, place, and time. He appears well-developed and well-nourished. No distress.   HENT:   Head: Normocephalic.   Mouth/Throat: Oropharynx is clear and moist.   Eyes: Conjunctivae and EOM are normal.   Neck: Neck supple. No JVD present.   Cardiovascular: Normal rate and regular rhythm.   Pulmonary/Chest: Effort normal and breath sounds normal. No respiratory distress. He has no wheezes. He has no rales.   Abdominal: Soft. Bowel sounds are normal. He exhibits no distension. There is no tenderness. There is no rebound and no guarding.   Musculoskeletal: Normal range of motion. He exhibits no edema, tenderness or deformity.   Neurological: He is alert and oriented to person, place, and time. No sensory deficit.   Skin: Skin is warm and dry. Capillary refill takes less than 2 seconds. No rash noted. He is not diaphoretic. No erythema. No pallor.   Psychiatric: Judgment and thought content normal.   Nursing note and vitals reviewed.      Procedures           ED Course  ED Course as of May 10 1650   Sun Mar 31, 2019   1825 EKG interpreted by me shows sinus tachycardia with a rate of 103 mild artifact versus PAC no ST segment elevations or depressions no ischemic changes.  [PF]      ED Course User Index  [PF] Avel Jackson, DO      No results found for this or any previous visit (from the past 24 hour(s)).  Note: In addition to lab results from this visit, the labs listed above may include labs taken at another facility or during a different encounter within the last 24 hours. Please correlate lab times with ED admission and discharge times for further clarification of the services performed during this visit.    No orders to display     Vitals:    03/31/19 1629   BP: (!) 144/106   BP Location: Right arm  "  Patient Position: Sitting   Pulse: (!) 124   Resp: 18   Temp: 98.3 °F (36.8 °C)   TempSrc: Oral   SpO2: 98%   Weight: 88.9 kg (196 lb)   Height: 167.6 cm (66\")     Medications - No data to display  ECG/EMG Results (last 24 hours)     ** No results found for the last 24 hours. **        ECG 12 Lead    (Results Pending)                   MDM      Final diagnoses:   Chronic hyperventilation syndrome            Salma Bradford, APRN  05/10/19 7169    "

## 2019-05-23 ENCOUNTER — OFFICE VISIT (OUTPATIENT)
Dept: GASTROENTEROLOGY | Facility: CLINIC | Age: 44
End: 2019-05-23

## 2019-05-23 VITALS
DIASTOLIC BLOOD PRESSURE: 99 MMHG | TEMPERATURE: 98.6 F | BODY MASS INDEX: 33.12 KG/M2 | RESPIRATION RATE: 16 BRPM | SYSTOLIC BLOOD PRESSURE: 156 MMHG | HEART RATE: 99 BPM | WEIGHT: 211 LBS | HEIGHT: 67 IN

## 2019-05-23 DIAGNOSIS — R79.89 ABNORMAL LIVER FUNCTION TESTS: ICD-10-CM

## 2019-05-23 DIAGNOSIS — E66.3 OVER WEIGHT: ICD-10-CM

## 2019-05-23 DIAGNOSIS — R12 HEARTBURN: Primary | ICD-10-CM

## 2019-05-23 DIAGNOSIS — F10.221: ICD-10-CM

## 2019-05-23 PROCEDURE — 99214 OFFICE O/P EST MOD 30 MIN: CPT | Performed by: INTERNAL MEDICINE

## 2019-05-23 NOTE — PROGRESS NOTES
Chief Complaint   Patient presents with   • Heartburn     History of Present Illness     The patient has a history of reflux off and on for the last several years.  The reflux is moderately severe.  Symptoms are described as retrosternal burning sensation, and indigestion.  There is history of occasional regurgitative symptoms.  Frequency being several times per week.  The symptoms are worse at night.  The patient takes acid suppressive therapy with reasonable control of his symptoms.    The patient has history of abnormal liver function tests off and on for the last 3 to 4 years.  Thrombocytopenia in the past.  Recently the patient was noted to have normal platelet count.  He used to drink heavily in the past (fifth of whiskey in addition to beer on a daily basis).  However over the last several months he has cut it down to about 7 beers per day on the weekends.  He is not drinking whiskey anymore.  There is history of binge drinking.    The patient denies abdominal pain.  He denies nausea or vomiting.  There is no dysphagia or odynophagia.  There is no history of hematemesis, melena or hematochezia.  The patient denies anemia.  The patient paints homes.    Review of Systems   Constitutional: Negative for appetite change, chills, fatigue, fever and unexpected weight change.   HENT: Negative for mouth sores, nosebleeds and trouble swallowing.    Eyes: Negative for discharge and redness.   Respiratory: Positive for apnea. Negative for cough and shortness of breath.    Cardiovascular: Negative for chest pain, palpitations and leg swelling.   Gastrointestinal: Negative for abdominal distention, abdominal pain, anal bleeding, blood in stool, constipation, diarrhea, nausea and vomiting.   Endocrine: Negative for cold intolerance, heat intolerance and polydipsia.   Genitourinary: Negative for dysuria, hematuria and urgency.   Musculoskeletal: Positive for arthralgias. Negative for joint swelling and myalgias.   Skin:  Negative for rash.   Allergic/Immunologic: Negative for food allergies and immunocompromised state.   Neurological: Negative for dizziness, seizures, syncope and headaches.   Hematological: Negative for adenopathy. Does not bruise/bleed easily.   Psychiatric/Behavioral: Negative for dysphoric mood. The patient is nervous/anxious. The patient is not hyperactive.      Patient Active Problem List   Diagnosis   • Alcohol dependence with delirium (CMS/HCC)   • Cigarette nicotine dependence with withdrawal   • Recurrent brief depressive disorder (CMS/HCC)   • Elevated transaminase level   • Thrombocytopenia (CMS/HCC)   • Heartburn   • Elevated liver function tests   • Alcohol abuse     Past Medical History:   Diagnosis Date   • Acne    • Alcoholism (CMS/HCC)    • Anxiety    • Arm fracture, left    • Arm fracture, right    • Arthritis    • Back pain    • Fracture, finger    • GERD (gastroesophageal reflux disease)    • Hypertension    • Panic    • RLS (restless legs syndrome)    • Seizures (CMS/HCC)    • Sleep apnea    • TIA (transient ischemic attack)    • Withdrawal symptoms, alcohol (CMS/HCC)      Past Surgical History:   Procedure Laterality Date   • ENDOSCOPY N/A 2019    Procedure: ESOPHAGOGASTRODUODENOSCOPY with biopsies;  Surgeon: Bari Merrill MD;  Location: [facility] ENDOSCOPY;  Service: Gastroenterology     Family History   Problem Relation Age of Onset   • Alcohol abuse Father    • Bone cancer Father 51   • Anxiety disorder Maternal Uncle    • Depression Maternal Uncle    • Dementia Maternal Grandfather    • Dementia Maternal Grandmother    • Brain cancer Mother 32   • Colon cancer Paternal Uncle 60   • Pancreatic cancer Paternal Grandfather 80     Social History     Tobacco Use   • Smoking status: Former Smoker     Packs/day: 0.50     Years: 30.00     Pack years: 15.00     Types: Cigarettes     Last attempt to quit: 2018     Years since quittin.5   • Smokeless tobacco: Current User     Types:  "Snuff, Chew   Substance Use Topics   • Alcohol use: Yes     Alcohol/week: 29.4 oz     Types: 49 Cans of beer per week     Comment: 1 pint whiskey daily for 3 years; occasioanl a fifth on weekends when drinking beers       Current Outpatient Medications:   •  Cholecalciferol (VITAMIN D3) 5000 units capsule capsule, Take 800 Units by mouth Daily., Disp: , Rfl:   •  LOSARTAN POTASSIUM PO, Take 100 mg by mouth Daily., Disp: , Rfl:   •  omeprazole (PRILOSEC) 40 MG capsule, Take 1 capsule by mouth 30 minutes before breakfast daily., Disp: 30 capsule, Rfl: 3    Allergies   Allergen Reactions   • Promethazine Hallucinations       Blood pressure 156/99, pulse 99, temperature 98.6 °F (37 °C), resp. rate 16, height 170.2 cm (67\"), weight 95.7 kg (211 lb).    Physical Exam   Constitutional: He is oriented to person, place, and time. He appears well-developed and well-nourished. No distress.   HENT:   Head: Normocephalic and atraumatic.   Right Ear: Hearing and external ear normal.   Left Ear: Hearing and external ear normal.   Nose: Nose normal.   Mouth/Throat: Oropharynx is clear and moist and mucous membranes are normal. Mucous membranes are not pale, not dry and not cyanotic. No oral lesions. No oropharyngeal exudate.   Eyes: Conjunctivae and EOM are normal. Right eye exhibits no discharge. Left eye exhibits no discharge. No scleral icterus.   Neck: Trachea normal. Neck supple. No JVD present. No edema present. No thyroid mass and no thyromegaly present.   Cardiovascular: Normal rate, regular rhythm, S2 normal and normal heart sounds. Exam reveals no gallop, no S3 and no friction rub.   No murmur heard.  Pulmonary/Chest: Effort normal and breath sounds normal. No respiratory distress. He has no wheezes. He has no rales. He exhibits no tenderness.   Abdominal: Soft. Normal appearance and bowel sounds are normal. He exhibits no distension, no ascites and no mass. There is no splenomegaly or hepatomegaly. There is no " tenderness. There is no rigidity, no rebound and no guarding. No hernia.   Musculoskeletal: He exhibits no tenderness or deformity.     Vascular Status -  His right foot exhibits no edema. His left foot exhibits no edema.  Lymphadenopathy:     He has no cervical adenopathy.        Left: No supraclavicular adenopathy present.   Neurological: He is alert and oriented to person, place, and time. He has normal strength. No cranial nerve deficit or sensory deficit. He exhibits normal muscle tone. Coordination normal.   Skin: No rash noted. He is not diaphoretic. No cyanosis. No pallor. Nails show no clubbing.   Psychiatric: He has a normal mood and affect. His behavior is normal. Judgment and thought content normal.   Nursing note and vitals reviewed.  Stigmata of chronic liver disease:  None.  Asterixis:  None.    Laboratory Testing:  Upon review of medical records:     Dated 1/30/2018 glucose 78 potassium 4.0 sodium 136 BUN 14 creatinine 0.97 calcium 9.5 CO2 26 chloride 98 albumin 4.6 alkaline phosphatase 75 ALT 15 AST 17 total bilirubin 0.8 WBC 8.9 hemoglobin 17.5 hematocrit 52.1 platelet count 117 MCV 93.2 Hemoglobin A1c 4.9, TSH 2.21, folate >24.0, B12 543, ferritin 395, erythropoietin 5.6, hepatitis A IgM: Nonreactive, hepatitis B core IgM: Nonreactive, hepatitis B surface antigen: Nonreactive, hepatitis C antibody: Nonreactive, HIV AG/AB, fourth generation: Nonreactive, LEXI: Negative, rheumatoid factor: <14, CALEB EXON 12/13 mutations: Not detected, JAK2 V617F: not detected     Dated 3/31/2019 glucose 98 BUN 17 creatinine 1.3 sodium 132 potassium 3.6 chloride 92 CO2 23 calcium 9.0 albumin 4.4 ALT 37 AST 81 alkaline phosphatase 103 total bilirubin 1.0 WBC 9.34 hemoglobin 17.6 hematocrit 47.5 platelet count 177 MCV 89.1 lipase 48    Procedures:  Upon review of medical records:     Dated April 18, 2019 the patient underwent an upper endoscopy which revealed: Erosive distal esophagitis.  LA class A.  Short segment  "Winkler's appearing mucosa.  Small sliding hiatal hernia less than 3 cm.  Polypoid area at cardia.  Subtle scalloping within the second portion of duodenum.  No esophageal, cardia or gastric varices.  No endoscopic suggestion of portal hypertensive gastropathy.  Second portion of duodenum, biopsy revealed small intestinal mucosa with unremarkable villous morphology.  No parasites or tumor identified.  Antrum, body and fundus, biopsies revealed oxyntic mucosa with no pathologic diagnosis.  No gastritis identified.  No intestinal metaplasia or atrophy identified.  No Helicobacter identified on routine stains.  No tumor identified.  Esophagus, island of Winkler's type mucosa, biopsy revealed squamocolumnar junction with reflux esophagitis.  No intestinal metaplasia identified.  Negative for dysplasia and malignancy.  Stomach, cardia, cardiac polypoid area, biopsy revealed gastric cardia mucosa with chronic gastritis.  No intestinal metaplasia identified.  No tumor identified.    Assessment:      ICD-10-CM ICD-9-CM   1. Heartburn R12 787.1   2. Alcohol dependence with delirium (CMS/HCC) F10.221 291.0   3. Over weight E66.3 278.02   4. Abnormal liver function tests R94.5 790.6         Discussion:  1. History of thrombocytopenia likely secondary to acute toxic effect of alcohol.  Improved.    Plan/  Patient Instructions   1. Antireflux measures.  2. Avoidance of alcohol altogether.  3. Weight reduction.  4. Prilosec(Omeprazole) DR capsule 40 mg. Take one capsule in the morning half an hour before eating daily.  5. Issue of \"Tylenol warning\":  Tylenol remains a safe medication in this patient.  However caution should be exercised in terms of amount of Tylenol consumed a time and duration of the next dose.  The patient may take Tylenol or Acetaminophen up to 500 mg to be spaced every 6 hours as needed.  Furthermore,  caution should be exercised regarding taking other medications that contain Acetaminophen or Tylenol such as " NyQuil.  6. Colonoscopy: Description of the procedure, risks benefits alternatives and options including non-operative options were discussed with the patient in detail.  The patient understands and will call back.       Bari Merrill MD

## 2019-05-28 NOTE — PATIENT INSTRUCTIONS
"1. Antireflux measures.  2. Avoidance of alcohol altogether.  3. Weight reduction.  4. Prilosec(Omeprazole) DR capsule 40 mg. Take one capsule in the morning half an hour before eating daily.  5. Issue of \"Tylenol warning\":  Tylenol remains a safe medication in this patient.  However caution should be exercised in terms of amount of Tylenol consumed a time and duration of the next dose.  The patient may take Tylenol or Acetaminophen up to 500 mg to be spaced every 6 hours as needed.  Furthermore,  caution should be exercised regarding taking other medications that contain Acetaminophen or Tylenol such as NyQuil.  6. Colonoscopy: Description of the procedure, risks benefits alternatives and options including non-operative options were discussed with the patient in detail.  The patient understands and will call back.  "

## 2019-10-11 PROBLEM — R79.89 ELEVATED FERRITIN LEVEL: Status: ACTIVE | Noted: 2019-10-11

## 2019-10-23 ENCOUNTER — PRIOR AUTHORIZATION (OUTPATIENT)
Dept: GASTROENTEROLOGY | Facility: CLINIC | Age: 44
End: 2019-10-23

## 2020-02-10 RX ORDER — OMEPRAZOLE 40 MG/1
CAPSULE, DELAYED RELEASE ORAL
Qty: 30 CAPSULE | Refills: 2 | Status: SHIPPED | OUTPATIENT
Start: 2020-02-10

## 2020-07-09 RX ORDER — OMEPRAZOLE 40 MG/1
CAPSULE, DELAYED RELEASE ORAL
Qty: 30 CAPSULE | Refills: 2 | OUTPATIENT
Start: 2020-07-09

## 2020-09-01 RX ORDER — OMEPRAZOLE 40 MG/1
CAPSULE, DELAYED RELEASE ORAL
Qty: 30 CAPSULE | Refills: 2 | OUTPATIENT
Start: 2020-09-01

## 2020-09-14 RX ORDER — OMEPRAZOLE 40 MG/1
CAPSULE, DELAYED RELEASE ORAL
Qty: 30 CAPSULE | Refills: 2 | OUTPATIENT
Start: 2020-09-14

## 2020-11-18 PROCEDURE — U0004 COV-19 TEST NON-CDC HGH THRU: HCPCS | Performed by: NURSE PRACTITIONER

## 2020-12-21 RX ORDER — OMEPRAZOLE 40 MG/1
CAPSULE, DELAYED RELEASE ORAL
Qty: 30 CAPSULE | Refills: 2 | OUTPATIENT
Start: 2020-12-21

## 2022-08-17 PROCEDURE — U0004 COV-19 TEST NON-CDC HGH THRU: HCPCS | Performed by: NURSE PRACTITIONER

## 2023-03-29 ENCOUNTER — TRANSCRIBE ORDERS (OUTPATIENT)
Dept: ADMINISTRATIVE | Facility: HOSPITAL | Age: 48
End: 2023-03-29
Payer: COMMERCIAL

## 2023-03-29 DIAGNOSIS — Z87.891 PERSONAL HISTORY OF TOBACCO USE, PRESENTING HAZARDS TO HEALTH: Primary | ICD-10-CM

## 2023-04-24 ENCOUNTER — HOSPITAL ENCOUNTER (OUTPATIENT)
Dept: CT IMAGING | Facility: HOSPITAL | Age: 48
Discharge: HOME | End: 2023-04-24
Admitting: NURSE PRACTITIONER
Payer: COMMERCIAL

## 2023-04-24 DIAGNOSIS — Z87.891 PERSONAL HISTORY OF TOBACCO USE, PRESENTING HAZARDS TO HEALTH: ICD-10-CM

## 2023-04-24 PROCEDURE — 71271 CT THORAX LUNG CANCER SCR C-: CPT

## 2024-05-06 ENCOUNTER — TRANSCRIBE ORDERS (OUTPATIENT)
Dept: ADMINISTRATIVE | Facility: HOSPITAL | Age: 49
End: 2024-05-06
Payer: COMMERCIAL

## 2024-05-06 DIAGNOSIS — Z87.891 PERSONAL HISTORY OF TOBACCO USE, PRESENTING HAZARDS TO HEALTH: Primary | ICD-10-CM

## (undated) DEVICE — KT ORCA VLV SXN AIR/H2O W/SEAL 1P/U STRL

## (undated) DEVICE — Device

## (undated) DEVICE — FRCP BIOP COLD ENDOJAW ALLGTR W/NDL 2.8X2300MM BLU

## (undated) DEVICE — 2000CC GUARDIAN II: Brand: GUARDIAN

## (undated) DEVICE — CONMED SCOPE SAVER BITE BLOCK, 20X27 MM: Brand: SCOPE SAVER

## (undated) DEVICE — ENDOGATOR AUXILIARY WATER JET CONNECTOR: Brand: ENDOGATOR